# Patient Record
Sex: FEMALE | URBAN - METROPOLITAN AREA
[De-identification: names, ages, dates, MRNs, and addresses within clinical notes are randomized per-mention and may not be internally consistent; named-entity substitution may affect disease eponyms.]

---

## 2017-06-10 ENCOUNTER — EMERGENCY (EMERGENCY)
Facility: HOSPITAL | Age: 67
LOS: 1 days | Discharge: HOME | End: 2017-06-10
Attending: EMERGENCY MEDICINE | Admitting: EMERGENCY MEDICINE
Payer: MEDICARE

## 2017-06-10 VITALS
HEART RATE: 73 BPM | TEMPERATURE: 98 F | RESPIRATION RATE: 18 BRPM | SYSTOLIC BLOOD PRESSURE: 197 MMHG | DIASTOLIC BLOOD PRESSURE: 94 MMHG | OXYGEN SATURATION: 100 %

## 2017-06-10 DIAGNOSIS — R29.898 OTHER SYMPTOMS AND SIGNS INVOLVING THE MUSCULOSKELETAL SYSTEM: ICD-10-CM

## 2017-06-10 LAB
ALBUMIN SERPL ELPH-MCNC: 4.5 G/DL — SIGNIFICANT CHANGE UP (ref 3.3–5)
ALP SERPL-CCNC: 95 U/L — SIGNIFICANT CHANGE UP (ref 40–120)
ALT FLD-CCNC: 21 U/L — SIGNIFICANT CHANGE UP (ref 4–33)
APTT BLD: 30.7 SEC — SIGNIFICANT CHANGE UP (ref 27.5–37.4)
AST SERPL-CCNC: 22 U/L — SIGNIFICANT CHANGE UP (ref 4–32)
BASOPHILS # BLD AUTO: 0.02 K/UL — SIGNIFICANT CHANGE UP (ref 0–0.2)
BASOPHILS NFR BLD AUTO: 0.3 % — SIGNIFICANT CHANGE UP (ref 0–2)
BILIRUB SERPL-MCNC: 0.2 MG/DL — SIGNIFICANT CHANGE UP (ref 0.2–1.2)
BLD GP AB SCN SERPL QL: NEGATIVE — SIGNIFICANT CHANGE UP
BUN SERPL-MCNC: 26 MG/DL — HIGH (ref 7–23)
CALCIUM SERPL-MCNC: 9.5 MG/DL — SIGNIFICANT CHANGE UP (ref 8.4–10.5)
CHLORIDE SERPL-SCNC: 104 MMOL/L — SIGNIFICANT CHANGE UP (ref 98–107)
CK MB BLD-MCNC: 2.4 NG/ML — SIGNIFICANT CHANGE UP (ref 1–4.7)
CK SERPL-CCNC: 86 U/L — SIGNIFICANT CHANGE UP (ref 25–170)
CO2 SERPL-SCNC: 26 MMOL/L — SIGNIFICANT CHANGE UP (ref 22–31)
CREAT SERPL-MCNC: 0.85 MG/DL — SIGNIFICANT CHANGE UP (ref 0.5–1.3)
EOSINOPHIL # BLD AUTO: 0.28 K/UL — SIGNIFICANT CHANGE UP (ref 0–0.5)
EOSINOPHIL NFR BLD AUTO: 3.8 % — SIGNIFICANT CHANGE UP (ref 0–6)
GLUCOSE SERPL-MCNC: 112 MG/DL — HIGH (ref 70–99)
HCT VFR BLD CALC: 42.1 % — SIGNIFICANT CHANGE UP (ref 34.5–45)
HGB BLD-MCNC: 13.6 G/DL — SIGNIFICANT CHANGE UP (ref 11.5–15.5)
IMM GRANULOCYTES NFR BLD AUTO: 0.5 % — SIGNIFICANT CHANGE UP (ref 0–1.5)
INR BLD: 0.96 — SIGNIFICANT CHANGE UP (ref 0.88–1.17)
LYMPHOCYTES # BLD AUTO: 1.96 K/UL — SIGNIFICANT CHANGE UP (ref 1–3.3)
LYMPHOCYTES # BLD AUTO: 26.3 % — SIGNIFICANT CHANGE UP (ref 13–44)
MCHC RBC-ENTMCNC: 29.8 PG — SIGNIFICANT CHANGE UP (ref 27–34)
MCHC RBC-ENTMCNC: 32.3 % — SIGNIFICANT CHANGE UP (ref 32–36)
MCV RBC AUTO: 92.1 FL — SIGNIFICANT CHANGE UP (ref 80–100)
MONOCYTES # BLD AUTO: 0.8 K/UL — SIGNIFICANT CHANGE UP (ref 0–0.9)
MONOCYTES NFR BLD AUTO: 10.7 % — SIGNIFICANT CHANGE UP (ref 2–14)
NEUTROPHILS # BLD AUTO: 4.35 K/UL — SIGNIFICANT CHANGE UP (ref 1.8–7.4)
NEUTROPHILS NFR BLD AUTO: 58.4 % — SIGNIFICANT CHANGE UP (ref 43–77)
PLATELET # BLD AUTO: 259 K/UL — SIGNIFICANT CHANGE UP (ref 150–400)
PMV BLD: 10.7 FL — SIGNIFICANT CHANGE UP (ref 7–13)
POTASSIUM SERPL-MCNC: 4.1 MMOL/L — SIGNIFICANT CHANGE UP (ref 3.5–5.3)
POTASSIUM SERPL-SCNC: 4.1 MMOL/L — SIGNIFICANT CHANGE UP (ref 3.5–5.3)
PROT SERPL-MCNC: 7.4 G/DL — SIGNIFICANT CHANGE UP (ref 6–8.3)
PROTHROM AB SERPL-ACNC: 10.8 SEC — SIGNIFICANT CHANGE UP (ref 9.8–13.1)
RBC # BLD: 4.57 M/UL — SIGNIFICANT CHANGE UP (ref 3.8–5.2)
RBC # FLD: 13.8 % — SIGNIFICANT CHANGE UP (ref 10.3–14.5)
RH IG SCN BLD-IMP: POSITIVE — SIGNIFICANT CHANGE UP
SODIUM SERPL-SCNC: 145 MMOL/L — SIGNIFICANT CHANGE UP (ref 135–145)
TROPONIN T SERPL-MCNC: < 0.06 NG/ML — SIGNIFICANT CHANGE UP (ref 0–0.06)
WBC # BLD: 7.45 K/UL — SIGNIFICANT CHANGE UP (ref 3.8–10.5)
WBC # FLD AUTO: 7.45 K/UL — SIGNIFICANT CHANGE UP (ref 3.8–10.5)

## 2017-06-10 PROCEDURE — 71020: CPT | Mod: 26

## 2017-06-10 PROCEDURE — 99218: CPT

## 2017-06-10 PROCEDURE — 70450 CT HEAD/BRAIN W/O DYE: CPT | Mod: 26

## 2017-06-10 NOTE — ED ADULT TRIAGE NOTE - CHIEF COMPLAINT QUOTE
r/o cva    pt had sudden onset of right sided numbness and tingling, right sided facial droop, slurred/garbled speech approx 45 mins ago.  accuheck 100. seen by dr bloch..stroke code called. pt taken to [facility]

## 2017-06-10 NOTE — CONSULT NOTE ADULT - ASSESSMENT
67 y/o RH  woman with hx of hypothyroidism p/w right hand weakness, paresthesias, and right facial droop, which is improving. NIHSS 1, MRS 0. TIA vs small left cortical or thalamic stroke. Risks and benefits of TPA discussed with the patient and her  who decided not to go ahead with giving TPA due to that her symptoms were very mild.

## 2017-06-10 NOTE — ED ADULT NURSE NOTE - CHPI ED SYMPTOMS NEG
no confusion/no change in level of consciousness/no nausea/no loss of consciousness/no fever/no blurred vision/no dizziness/no vomiting

## 2017-06-10 NOTE — ED PROVIDER NOTE - PROGRESS NOTE DETAILS
Jerry Marie MD PGY3: Labs, imaging reviewed. Decision made for CDU. Case d/w CDU PA, accepted for stroke workup. Keshia Law [phone] (cell)

## 2017-06-10 NOTE — CONSULT NOTE ADULT - PROBLEM SELECTOR RECOMMENDATION 9
ED will see if she can get a room in the CDU for observation  MRI head   MRA head and neck (neck w/co)  check A1c and Lipid panel  ASA and lipitor, can stop lipitor if LDL <70.   dysphagia screen ED will see if she can get a room in the CDU for observation  MRI head   MRA head and neck (neck w/co)  check A1c and Lipid panel  ASA and lipitor, can stop lipitor if LDL <70.   dysphagia screen  - D/W Stroke fellow

## 2017-06-10 NOTE — ED PROVIDER NOTE - MEDICAL DECISION MAKING DETAILS
Right face weakness and right hand weakness.  Symptoms resolved at this time.  Code stroke initiated when pt arrived.  CT head.  Neuro evaluation.  Will reassess.

## 2017-06-10 NOTE — ED ADULT NURSE NOTE - OBJECTIVE STATEMENT
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 PMhx Hypothyroid and GERD denies having SOB chest pain dizziness HA N V sweating palpitation before and after the symptoms started.on cm sinus rhythm MD ED attending/ Neurologist at bedside evaluate the pt.

## 2017-06-10 NOTE — ED PROVIDER NOTE - OBJECTIVE STATEMENT
Attendinyo female presents with right sided face weakness and right hand weakness about 1 hour prior to arrival.  Pt was at a high school reunion and had difficulty speaking and grasping things with her right arm.  Felt tingling in the hand.  Came to ED.  Upon arrival in the ED, pt states symptoms are improved.  States she is still having intermittent tingling in the right hand.  No weakness at this time.  At this time, the facial symptoms have resolved.  Never had this happen before.  Of note, pt visiting from [place] and does not have PCP here in [place].

## 2017-06-10 NOTE — CONSULT NOTE ADULT - SUBJECTIVE AND OBJECTIVE BOX
CHIEF COMPLAINT: right hand weakness and right facial droop.     HPI:  This is a 66 right handed  woman with hx of hypothyroidism presents as a code stroke for sudden onset right hand weakness, tingling, and right facial droop. Pts  at bedside who is a Urologist. They were here on a trip from [place] for the [employer] H[employer] reunion. Pt started feeling unwell, then hand right hand weakness and was unable to hold onto her cell phone and it dropped. Also had a few seconds of difficulty speaking which resolved. Still having the tingling on mostly the ulnar side of her had but said it went all the way up to her shoulder. NIHSS 1 MRS 0.     REVIEW OF SYSTEMS:    No blurry vision, no headache, no chest pain or SOB.    PAST MEDICAL & SURGICAL HISTORY:  GERD (gastroesophageal reflux disease)  Hypothyroid    MEDICATIONS  (STANDING): nexium and levothyroxine    MEDICATIONS  (PRN):    Allergies    No Known Allergies    Intolerances      FAMILY HISTORY:  No significant    SOCIAL HISTORY:    Living Situation:  Lives in [place] with   Tobacco:  no hx of smoking  Alcohol:  no hx of alcohol use  Drugs:  no drug use.    VITAL SIGNS:  Vital Signs Last 24 Hrs  T(C): 36.4, Max: 36.8 (06-10 @ 21:09)  T(F): 97.6, Max: 98.2 (06-10 @ 21:09)  HR: 65 (65 - 73)  BP: 154/83 (154/83 - 197/94)  BP(mean): --  RR: 17 (17 - 18)  SpO2: 100% (100% - 100%)    PHYSICAL EXAMINATION:  General: Well-developed, well nourished, in no acute distress.  Eyes: Conjunctiva and sclera clear.  Cardiovascular: Regular rate and rhythm; S1 and S2 Normal; No murmurs, gallops or rubs.  Neurologic:  - Mental Status:  Alert, awake, oriented to person, place, and time; Speech is fluent with intact naming, repetition, and comprehension; No dysarthria.  - Cranial Nerves II-XII:    II:  Visual fields are full to confrontation;Pupils are equal, round, and reactive to light.  III, IV, VI:  Extraocular movements are intact without nystagmus.  V:  Facial sensation is intact in the V1-V3 distribution bilaterally.  VII:  slight right corner of mouth down turned on right, normal eye closure and smile  VIII:  Hearing is intact to finger rub.  IX, X:  Uvula is midline and soft palate rises symmetrically  XI:  Head turning and shoulder shrug are intact.  XII:  Tongue protrudes in the midline.  - Motor:   There is no pronator drift.  RUE deltoid/biceps/triceps 5/5, wrist flex/ext 5-/5. finger flexors, extensors, intrinsics 4/5. LUE 5/5 throughout. b/l LE no drift 5/5.   Normal muscle bulk and tone throughout.  - Sensory:  Intact to light touch, pin prick, vibration, and joint-position sense throughout.  - Coordination:  Finger-nose-finger and heel-knee-shin intact without dysmetria.     LABS:                         13.6   7.45  )-----------( 259      ( 10 Nixon 2017 02:19 )             42.1      06-10    145  |  104  |  26<H>  ----------------------------<  112<H>  4.1   |  26  |  0.85    Ca    9.5      10 Nixon 2017 02:19    TPro  7.4  /  Alb  4.5  /  TBili  0.2  /  DBili  x   /  AST  22  /  ALT  21  /  AlkPhos  95  06-10      RADIOLOGY & ADDITIONAL STUDIES:    No acute intracranial hemorrhage, mass effect or hydrocephalus.  No acute demarcated territorial infarct. CHIEF COMPLAINT: right hand weakness and right facial droop.     HPI:  This is a 66 right handed  woman with hx of hypothyroidism presents as a code stroke for sudden onset right hand weakness, tingling, and right facial droop. Pts  at bedside who is a Urologist. They were here on a trip from [place] for the [employer] H[employer] reunion. Pt started feeling unwell, then hand right hand weakness and was unable to hold onto her cell phone and it dropped. Also had a few seconds of difficulty speaking which resolved. Still having the tingling on mostly the ulnar side of her had but said it went all the way up to her shoulder. NIHSS 1 MRS 0.     REVIEW OF SYSTEMS:    No blurry vision, no headache, no chest pain or SOB.    PAST MEDICAL & SURGICAL HISTORY:  GERD (gastroesophageal reflux disease)  Hypothyroid    MEDICATIONS  (STANDING): nexium and levothyroxine    MEDICATIONS  (PRN):    Allergies    No Known Allergies    Intolerances      FAMILY HISTORY:  No significant    SOCIAL HISTORY:    Living Situation:  Lives in [place] with   Tobacco:  no hx of smoking  Alcohol:  no hx of alcohol use  Drugs:  no drug use.    VITAL SIGNS:  Vital Signs Last 24 Hrs  T(C): 36.4, Max: 36.8 (06-10 @ 21:09)  T(F): 97.6, Max: 98.2 (06-10 @ 21:09)  HR: 65 (65 - 73)  BP: 154/83 (154/83 - 197/94)  BP(mean): --  RR: 17 (17 - 18)  SpO2: 100% (100% - 100%)    PHYSICAL EXAMINATION:  General: Well-developed, well nourished, in no acute distress.  Eyes: Conjunctiva and sclera clear.  Cardiovascular: Regular rate and rhythm; S1 and S2 Normal; No murmurs, gallops or rubs.  Neurologic:  - Mental Status:  Alert, awake, oriented to person, place, and time; Speech is fluent with intact naming, repetition, and comprehension; No dysarthria.  - Cranial Nerves II-XII:    II:  Visual fields are full to confrontation;Pupils are equal, round, and reactive to light.  III, IV, VI:  Extraocular movements are intact without nystagmus.  V:  Facial sensation is intact in the V1-V3 distribution bilaterally.  VII:  slight right corner of mouth down turned on right, normal eye closure and smile  VIII:  Hearing is intact to finger rub.  IX, X:  Uvula is midline and soft palate rises symmetrically  XI:  Head turning and shoulder shrug are intact.  XII:  Tongue protrudes in the midline.  - Motor:   There is no pronator drift.  RUE deltoid/biceps/triceps 5/5, wrist flex/ext 5-/5. finger flexors, extensors, intrinsics 4/5. LUE 5/5 throughout. b/l LE no drift 5/5.   Normal muscle bulk and tone throughout.  - Sensory:  Intact to light touch, pin prick, vibration, and joint-position sense throughout.  - Coordination:  Finger-nose-finger and heel-knee-shin intact without dysmetria.     LABS:                         13.6   7.45  )-----------( 259      ( 10 Nixon 2017 02:19 )             42.1      06-10    145  |  104  |  26<H>  ----------------------------<  112<H>  4.1   |  26  |  0.85    Ca    9.5      10 Nixon 2017 02:19    TPro  7.4  /  Alb  4.5  /  TBili  0.2  /  DBili  x   /  AST  22  /  ALT  21  /  AlkPhos  95  06-10    ROS negative      RADIOLOGY & ADDITIONAL STUDIES:    No acute intracranial hemorrhage, mass effect or hydrocephalus.  No acute demarcated territorial infarct.

## 2017-06-10 NOTE — CONSULT NOTE ADULT - ATTENDING COMMENTS
Seen and examined on rounds.  Currently with a mild R facial, mild R pronator drift, though resolving.  MRI brain revealed small infarcts in the left frontal and posterior temporal regions.  MRA head and neck are unremarkable.  Plan for TTE.  ASA 81, Lipitor 80 mg.  Follow up with their cardiologist for loop recorder.  Stroke education provided.

## 2017-06-11 VITALS
HEART RATE: 55 BPM | RESPIRATION RATE: 14 BRPM | OXYGEN SATURATION: 100 % | SYSTOLIC BLOOD PRESSURE: 139 MMHG | DIASTOLIC BLOOD PRESSURE: 85 MMHG | TEMPERATURE: 98 F

## 2017-06-11 DIAGNOSIS — Z98.891 HISTORY OF UTERINE SCAR FROM PREVIOUS SURGERY: Chronic | ICD-10-CM

## 2017-06-11 DIAGNOSIS — Z90.49 ACQUIRED ABSENCE OF OTHER SPECIFIED PARTS OF DIGESTIVE TRACT: Chronic | ICD-10-CM

## 2017-06-11 DIAGNOSIS — H33.23 SEROUS RETINAL DETACHMENT, BILATERAL: Chronic | ICD-10-CM

## 2017-06-11 PROCEDURE — 70548 MR ANGIOGRAPHY NECK W/DYE: CPT | Mod: 26

## 2017-06-11 PROCEDURE — 93306 TTE W/DOPPLER COMPLETE: CPT | Mod: 26

## 2017-06-11 PROCEDURE — 70544 MR ANGIOGRAPHY HEAD W/O DYE: CPT | Mod: 26

## 2017-06-11 PROCEDURE — 99217: CPT

## 2017-06-11 RX ORDER — PANTOPRAZOLE SODIUM 20 MG/1
40 TABLET, DELAYED RELEASE ORAL
Qty: 0 | Refills: 0 | Status: DISCONTINUED | OUTPATIENT
Start: 2017-06-11 | End: 2017-06-14

## 2017-06-11 RX ORDER — ASPIRIN/CALCIUM CARB/MAGNESIUM 324 MG
81 TABLET ORAL ONCE
Qty: 0 | Refills: 0 | Status: COMPLETED | OUTPATIENT
Start: 2017-06-11 | End: 2017-06-11

## 2017-06-11 RX ORDER — ATORVASTATIN CALCIUM 80 MG/1
1 TABLET, FILM COATED ORAL
Qty: 14 | Refills: 0 | OUTPATIENT
Start: 2017-06-11 | End: 2017-06-25

## 2017-06-11 RX ORDER — LEVOTHYROXINE SODIUM 125 MCG
1 TABLET ORAL
Qty: 0 | Refills: 0 | COMMUNITY

## 2017-06-11 RX ORDER — ESOMEPRAZOLE MAGNESIUM 40 MG/1
1 CAPSULE, DELAYED RELEASE ORAL
Qty: 0 | Refills: 0 | COMMUNITY

## 2017-06-11 RX ORDER — LEVOTHYROXINE SODIUM 125 MCG
25 TABLET ORAL DAILY
Qty: 0 | Refills: 0 | Status: DISCONTINUED | OUTPATIENT
Start: 2017-06-11 | End: 2017-06-14

## 2017-06-11 RX ORDER — ATORVASTATIN CALCIUM 80 MG/1
40 TABLET, FILM COATED ORAL DAILY
Qty: 0 | Refills: 0 | Status: DISCONTINUED | OUTPATIENT
Start: 2017-06-11 | End: 2017-06-14

## 2017-06-11 RX ADMIN — PANTOPRAZOLE SODIUM 40 MILLIGRAM(S): 20 TABLET, DELAYED RELEASE ORAL at 06:46

## 2017-06-11 RX ADMIN — ATORVASTATIN CALCIUM 40 MILLIGRAM(S): 80 TABLET, FILM COATED ORAL at 12:37

## 2017-06-11 RX ADMIN — Medication 81 MILLIGRAM(S): at 02:49

## 2017-06-11 RX ADMIN — Medication 25 MICROGRAM(S): at 06:46

## 2017-06-11 NOTE — ED CDU PROVIDER NOTE - FAMILY HISTORY
Mother  Still living? No  Family history of stroke, Age at diagnosis: Age Unknown Mother  Still living? No  Family history of stroke, Age at diagnosis: Age Unknown  Family history of bone cancer, Age at diagnosis: Age Unknown  Family history of ovarian cancer, Age at diagnosis: Age Unknown     Father  Still living? No  Family history of coronary artery disease, Age at diagnosis: Age Unknown  Family history of diabetes mellitus, Age at diagnosis: Age Unknown

## 2017-06-11 NOTE — ED CDU PROVIDER NOTE - MEDICAL DECISION MAKING DETAILS
MRI/MRA/orders as per neuro recommendations, cardiac telemetry monitoring, neuro checks, observation and reassessment.

## 2017-06-11 NOTE — ED CDU PROVIDER NOTE - CROS ED NEURO NEG
no difficulty walking/imbalance/no headache no difficulty walking/imbalance/no headache/no syncope, no trauma/fall

## 2017-06-11 NOTE — ED CDU PROVIDER NOTE - PROGRESS NOTE DETAILS
NIEVES Tian Addendum----  Pt. resting comfortably in interim; no issues to date; will continue to monitor / reassess. CDU Att PN: Tomas  Feeling much better, all symptoms gone.  No objective findings at present. Awaiting MRI and Neuro f/u, then likely DC. I have personally performed a face to face evaluation of this patient including review of the history and focused physical exam.  I have discussed the case and reviewed the plan of care with the PA. DESI Blake; Cleared for dc by neuro for Lipitor 80 mg and ASA 81 mg. To follow up with cardologist ( pt has one in [place]) on their way back this afternoon. CDU Att DC Note: Tomas Rain seen by Neuro mckayla porras f/u as outpt in [place].   I have personally performed a face to face evaluation of this patient including review of the history and focused physical exam.  I have discussed the case and reviewed the plan of care with the PA. DESI Blake; Cleared for dc by neuro for Lipitor 80 mg and ASA 81 mg. To follow up with cardiologist ( pt has one in [place]) on their way back this afternoon.

## 2017-06-11 NOTE — ED CDU PROVIDER NOTE - OBJECTIVE STATEMENT
Attendinyo female presents with right sided face weakness and right hand weakness about 1 hour prior to arrival.  Pt was at a high school reunion and had difficulty speaking and grasping things with her right arm.  Felt tingling in the hand.  Came to ED.  Upon arrival in the ED, pt states symptoms are improved.  States she is still having intermittent tingling in the right hand.  No weakness at this time.  At this time, the facial symptoms have resolved.  Never had this happen before.  Of note, pt visiting from [place] and does not have PCP here in [place].    CDU DESI Tian Note-----  ED Provider Note reviewed per above; pt is a 65 yo female with PMH of hypothyroidism and GERD who presented to the ED as noted above.  Pt. was evaluated in the ED and neurology was consulted; pt was sent to CDU for MRI/MRA/orders as per neuro recommendations, cardiac telemetry monitoring, neuro checks, observation and reassessment.  On CDU exam, Attendinyo female presents with right sided face weakness and right hand weakness about 1 hour prior to arrival.  Pt was at a high school reunion and had difficulty speaking and grasping things with her right arm.  Felt tingling in the hand.  Came to ED.  Upon arrival in the ED, pt states symptoms are improved.  States she is still having intermittent tingling in the right hand.  No weakness at this time.  At this time, the facial symptoms have resolved.  Never had this happen before.  Of note, pt visiting from [place] and does not have PCP here in [place].    CDU DESI Tian Note-----  ED Provider Note reviewed per above; pt is a 65 yo female with PMH of hypothyroidism and GERD, who presented to the ED as noted above.  Pt. was evaluated in the ED and neurology was consulted; pt was sent to CDU for MRI/MRA/orders as per neuro recommendations, cardiac telemetry monitoring, neuro checks, observation and reassessment.  On CDU evaluation, pt states facial symptoms have resolved and speech is back at baseline; states paresthesias and cramping of right hand is also much improved; denies weakness in extremities.  No other c/o.  No headaches, no new changes in vision.  CDU plan discussed with pt; pt verbalizes agreement with plan.

## 2017-06-11 NOTE — ED CDU PROVIDER NOTE - INDICATION FOR OBSERVATION
MRI/MRA/orders as per neuro recommendations, cardiac telemetry monitoring, neuro checks, observation and reassessment./Other

## 2017-06-11 NOTE — ED CDU PROVIDER NOTE - PLAN OF CARE
Rest, drink plenty of fluids.  Advance activity as tolerated.  Continue all previously prescribed medications as directed. Take baby aspirin 81 mg once a day. Take Lipitor 80 mg every night at bedtime. Follow up with your primary care physician in 48-72 hours- bring copies of your results. Follow up with your cardiologist at the next available appointment. Return to the Emergency Department for worsening or persistent symptoms OR ANY NEW OR CONCERNING SYMPTOMS.

## 2017-06-11 NOTE — ED CDU PROVIDER NOTE - MUSCULOSKELETAL, MLM
Range of motion is not limited, strength in UE / LE is 5/5 bilaterally (proximally and distally).  +NVI with equal sensation as per pt.

## 2019-06-27 NOTE — ED ADULT NURSE NOTE - WEIGHT IN LBS
CBC Full  -  ( 27 Jun 2019 00:55 )  WBC Count : 3.75 K/uL  RBC Count : 4.01 M/uL  Hemoglobin : 10.9 g/dL  Hematocrit : 32.8 %  Platelet Count - Automated : 119 K/uL  Mean Cell Volume : 81.8 fL  Mean Cell Hemoglobin : 27.2 pg  Mean Cell Hemoglobin Concentration : 33.2 %  Auto Neutrophil # : 1.95 K/uL  Auto Lymphocyte # : 0.15 K/uL  Auto Monocyte # : 1.34 K/uL  Auto Eosinophil # : 0.00 K/uL  Auto Basophil # : 0.04 K/uL  Auto Neutrophil % : 52.0 %  Auto Lymphocyte % : 4.0 %  Auto Monocyte % : 35.7 %  Auto Eosinophil % : 0.0 %  Auto Basophil % : 1.1 %    06-26    138  |  101  |  13  ----------------------------<  83  4.5   |  24  |  0.20    Ca    10.6<H>      26 Jun 2019 07:35    TPro  6.9  /  Alb  4.4  /  TBili  0.3  /  DBili  x   /  AST  28  /  ALT  27  /  AlkPhos  169  06-26 139.3